# Patient Record
Sex: MALE | URBAN - NONMETROPOLITAN AREA
[De-identification: names, ages, dates, MRNs, and addresses within clinical notes are randomized per-mention and may not be internally consistent; named-entity substitution may affect disease eponyms.]

---
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
## 2023-11-13 ENCOUNTER — APPOINTMENT (EMERGENCY)
Dept: CT IMAGING | Facility: HOSPITAL | Age: 23
End: 2023-11-13
Payer: COMMERCIAL

## 2023-11-13 ENCOUNTER — HOSPITAL ENCOUNTER (EMERGENCY)
Facility: HOSPITAL | Age: 23
Discharge: HOME | End: 2023-11-13
Attending: EMERGENCY MEDICINE
Payer: COMMERCIAL

## 2023-11-13 VITALS
TEMPERATURE: 98 F | SYSTOLIC BLOOD PRESSURE: 96 MMHG | RESPIRATION RATE: 18 BRPM | DIASTOLIC BLOOD PRESSURE: 53 MMHG | HEART RATE: 51 BPM | OXYGEN SATURATION: 98 %

## 2023-11-13 DIAGNOSIS — R56.9 SEIZURE (HCC): Primary | ICD-10-CM

## 2023-11-13 DIAGNOSIS — F10.20 ALCOHOL DEPENDENCE (HCC): ICD-10-CM

## 2023-11-13 LAB
ALBUMIN SERPL BCP-MCNC: 4.7 G/DL (ref 3.5–5)
ALP SERPL-CCNC: 100 U/L (ref 34–104)
ALT SERPL W P-5'-P-CCNC: 42 U/L (ref 7–52)
ANION GAP SERPL CALCULATED.3IONS-SCNC: 13 MMOL/L
AST SERPL W P-5'-P-CCNC: 20 U/L (ref 13–39)
ATRIAL RATE: 80 BPM
BASOPHILS # BLD AUTO: 0.05 THOUSANDS/ÂΜL (ref 0–0.1)
BASOPHILS NFR BLD AUTO: 0 % (ref 0–1)
BILIRUB SERPL-MCNC: 0.7 MG/DL (ref 0.2–1)
BUN SERPL-MCNC: 16 MG/DL (ref 5–25)
CALCIUM SERPL-MCNC: 9.6 MG/DL (ref 8.4–10.2)
CHLORIDE SERPL-SCNC: 101 MMOL/L (ref 96–108)
CO2 SERPL-SCNC: 22 MMOL/L (ref 21–32)
CREAT SERPL-MCNC: 0.88 MG/DL (ref 0.6–1.3)
EOSINOPHIL # BLD AUTO: 0.01 THOUSAND/ÂΜL (ref 0–0.61)
EOSINOPHIL NFR BLD AUTO: 0 % (ref 0–6)
ERYTHROCYTE [DISTWIDTH] IN BLOOD BY AUTOMATED COUNT: 13.9 % (ref 11.6–15.1)
ETHANOL SERPL-MCNC: <10 MG/DL
GFR SERPL CREATININE-BSD FRML MDRD: 121 ML/MIN/1.73SQ M
GLUCOSE SERPL-MCNC: 102 MG/DL (ref 65–140)
GLUCOSE SERPL-MCNC: 103 MG/DL (ref 65–140)
HCT VFR BLD AUTO: 43.7 % (ref 36.5–49.3)
HGB BLD-MCNC: 15.4 G/DL (ref 12–17)
IMM GRANULOCYTES # BLD AUTO: 0.06 THOUSAND/UL (ref 0–0.2)
IMM GRANULOCYTES NFR BLD AUTO: 0 % (ref 0–2)
LYMPHOCYTES # BLD AUTO: 1.74 THOUSANDS/ÂΜL (ref 0.6–4.47)
LYMPHOCYTES NFR BLD AUTO: 12 % (ref 14–44)
MCH RBC QN AUTO: 31.1 PG (ref 26.8–34.3)
MCHC RBC AUTO-ENTMCNC: 35.2 G/DL (ref 31.4–37.4)
MCV RBC AUTO: 88 FL (ref 82–98)
MONOCYTES # BLD AUTO: 0.63 THOUSAND/ÂΜL (ref 0.17–1.22)
MONOCYTES NFR BLD AUTO: 4 % (ref 4–12)
NEUTROPHILS # BLD AUTO: 11.93 THOUSANDS/ÂΜL (ref 1.85–7.62)
NEUTS SEG NFR BLD AUTO: 84 % (ref 43–75)
NRBC BLD AUTO-RTO: 0 /100 WBCS
P AXIS: 36 DEGREES
PLATELET # BLD AUTO: 327 THOUSANDS/UL (ref 149–390)
PMV BLD AUTO: 10.4 FL (ref 8.9–12.7)
POTASSIUM SERPL-SCNC: 4 MMOL/L (ref 3.5–5.3)
PR INTERVAL: 180 MS
PROT SERPL-MCNC: 7.5 G/DL (ref 6.4–8.4)
QRS AXIS: 81 DEGREES
QRSD INTERVAL: 98 MS
QT INTERVAL: 398 MS
QTC INTERVAL: 459 MS
RBC # BLD AUTO: 4.95 MILLION/UL (ref 3.88–5.62)
SODIUM SERPL-SCNC: 136 MMOL/L (ref 135–147)
T WAVE AXIS: 32 DEGREES
VENTRICULAR RATE: 80 BPM
WBC # BLD AUTO: 14.42 THOUSAND/UL (ref 4.31–10.16)

## 2023-11-13 PROCEDURE — 85025 COMPLETE CBC W/AUTO DIFF WBC: CPT | Performed by: EMERGENCY MEDICINE

## 2023-11-13 PROCEDURE — 80053 COMPREHEN METABOLIC PANEL: CPT | Performed by: EMERGENCY MEDICINE

## 2023-11-13 PROCEDURE — 82948 REAGENT STRIP/BLOOD GLUCOSE: CPT

## 2023-11-13 PROCEDURE — 96361 HYDRATE IV INFUSION ADD-ON: CPT

## 2023-11-13 PROCEDURE — 99285 EMERGENCY DEPT VISIT HI MDM: CPT | Performed by: EMERGENCY MEDICINE

## 2023-11-13 PROCEDURE — 82077 ASSAY SPEC XCP UR&BREATH IA: CPT | Performed by: EMERGENCY MEDICINE

## 2023-11-13 PROCEDURE — 93005 ELECTROCARDIOGRAM TRACING: CPT

## 2023-11-13 PROCEDURE — 99284 EMERGENCY DEPT VISIT MOD MDM: CPT

## 2023-11-13 PROCEDURE — 96365 THER/PROPH/DIAG IV INF INIT: CPT

## 2023-11-13 PROCEDURE — 96375 TX/PRO/DX INJ NEW DRUG ADDON: CPT

## 2023-11-13 PROCEDURE — 70450 CT HEAD/BRAIN W/O DYE: CPT

## 2023-11-13 PROCEDURE — 36415 COLL VENOUS BLD VENIPUNCTURE: CPT | Performed by: EMERGENCY MEDICINE

## 2023-11-13 PROCEDURE — 93010 ELECTROCARDIOGRAM REPORT: CPT | Performed by: INTERNAL MEDICINE

## 2023-11-13 RX ORDER — ONDANSETRON 2 MG/ML
4 INJECTION INTRAMUSCULAR; INTRAVENOUS ONCE
Status: COMPLETED | OUTPATIENT
Start: 2023-11-13 | End: 2023-11-13

## 2023-11-13 RX ADMIN — SODIUM CHLORIDE 1000 ML: 0.9 INJECTION, SOLUTION INTRAVENOUS at 13:51

## 2023-11-13 RX ADMIN — ONDANSETRON 4 MG: 2 INJECTION INTRAMUSCULAR; INTRAVENOUS at 12:19

## 2023-11-13 RX ADMIN — FOLIC ACID: 5 INJECTION, SOLUTION INTRAMUSCULAR; INTRAVENOUS; SUBCUTANEOUS at 14:58

## 2023-11-13 NOTE — ED PROVIDER NOTES
Pt Name: Harriett Mulligan  MRN: [id number]  [address] [place] [address]  Age/Sex: 21 y.o. male  Date of evaluation: 11/13/2023  PCP: No primary care provider on file. CHIEF COMPLAINT    Chief Complaint   Patient presents with    Seizure - Prior Hx Of     Per patients girlfriend, pt had a seizure right before arrival. Was reported as full body seizure. Pt does take med for seizure but unsure what and if he took it this morning. Pt still post ictal on arrival to ED. HPI    Judi Roman presents to the Emergency Department complaining of a seizure. He admits that it is alcohol related and it has happened to him in the past.  He does not want help with his addiction. He is  not on medication for seizures. HPI      Past Medical and Surgical History    Past Medical History:   Diagnosis Date    Fracture of right wrist        History reviewed. No pertinent surgical history. Family History   Problem Relation Age of Onset    Alcohol abuse Mother     Diabetes Maternal Grandfather        Social History     Tobacco Use    Smoking status: Never    Smokeless tobacco: Never   Substance Use Topics    Alcohol use: No    Drug use: Yes     Types: Marijuana         . Allergies    Allergies   Allergen Reactions    Penicillins        Home Medications    Prior to Admission medications    Not on File           Review of Systems    Review of Systems   Constitutional:  Negative for chills and fever. HENT:  Negative for ear pain and sore throat. Eyes:  Negative for pain and visual disturbance. Respiratory:  Negative for cough and shortness of breath. Cardiovascular:  Negative for chest pain and palpitations. Gastrointestinal:  Negative for abdominal pain and vomiting. Genitourinary:  Negative for dysuria and hematuria. Musculoskeletal:  Negative for arthralgias and back pain. Skin:  Negative for color change and rash. Neurological:  Positive for headaches. Negative for seizures and syncope.    All other systems reviewed and are negative. Physical Exam      ED Triage Vitals   Temperature Pulse Respirations Blood Pressure SpO2   11/13/23 1717 11/13/23 1204 11/13/23 1204 11/13/23 1204 11/13/23 1204   98 °F (36.7 °C) 68 16 115/61 100 %      Temp Source Heart Rate Source Patient Position - Orthostatic VS BP Location FiO2 (%)   11/13/23 1204 11/13/23 1204 11/13/23 1204 11/13/23 1204 --   Oral Monitor Lying Right arm       Pain Score       11/13/23 1204       No Pain               Physical Exam  Vitals and nursing note reviewed. Constitutional:       General: He is not in acute distress. Appearance: He is well-developed. He is not diaphoretic. HENT:      Head: Normocephalic and atraumatic. Nose: Nose normal.   Eyes:      Conjunctiva/sclera: Conjunctivae normal.      Pupils: Pupils are equal, round, and reactive to light. Cardiovascular:      Rate and Rhythm: Normal rate and regular rhythm. Heart sounds: Normal heart sounds. No murmur heard. No friction rub. No gallop. Pulmonary:      Effort: Pulmonary effort is normal. No respiratory distress. Breath sounds: Normal breath sounds. No wheezing or rales. Abdominal:      General: Bowel sounds are normal.      Palpations: Abdomen is soft. Tenderness: There is no abdominal tenderness. There is no guarding or rebound. Musculoskeletal:         General: Normal range of motion. Cervical back: Normal range of motion and neck supple. Skin:     General: Skin is warm and dry. Neurological:      Mental Status: He is alert and oriented to person, place, and time. Psychiatric:         Behavior: Behavior normal.         Assessment and Plan    Shayla Edgar is a 21 y.o. male who presents with an isolated seizure that is presumed from alcohol withdrawl. Physical examination unremarkable. . Plan will be to perform diagnostic testing and treat symptomatically as needed.       MDM      Diagnostic Results        Labs:    Results for orders placed or performed during the hospital encounter of 11/13/23   CBC and differential   Result Value Ref Range    WBC 14.42 (H) 4.31 - 10.16 Thousand/uL    RBC 4.95 3.88 - 5.62 Million/uL    Hemoglobin 15.4 12.0 - 17.0 g/dL    Hematocrit 43.7 36.5 - 49.3 %    MCV 88 82 - 98 fL    MCH 31.1 26.8 - 34.3 pg    MCHC 35.2 31.4 - 37.4 g/dL    RDW 13.9 11.6 - 15.1 %    MPV 10.4 8.9 - 12.7 fL    Platelets 030 333 - 719 Thousands/uL    nRBC 0 /100 WBCs    Neutrophils Relative 84 (H) 43 - 75 %    Immat GRANS % 0 0 - 2 %    Lymphocytes Relative 12 (L) 14 - 44 %    Monocytes Relative 4 4 - 12 %    Eosinophils Relative 0 0 - 6 %    Basophils Relative 0 0 - 1 %    Neutrophils Absolute 11.93 (H) 1.85 - 7.62 Thousands/µL    Immature Grans Absolute 0.06 0.00 - 0.20 Thousand/uL    Lymphocytes Absolute 1.74 0.60 - 4.47 Thousands/µL    Monocytes Absolute 0.63 0.17 - 1.22 Thousand/µL    Eosinophils Absolute 0.01 0.00 - 0.61 Thousand/µL    Basophils Absolute 0.05 0.00 - 0.10 Thousands/µL   Comprehensive metabolic panel   Result Value Ref Range    Sodium 136 135 - 147 mmol/L    Potassium 4.0 3.5 - 5.3 mmol/L    Chloride 101 96 - 108 mmol/L    CO2 22 21 - 32 mmol/L    ANION GAP 13 mmol/L    BUN 16 5 - 25 mg/dL    Creatinine 0.88 0.60 - 1.30 mg/dL    Glucose 102 65 - 140 mg/dL    Calcium 9.6 8.4 - 10.2 mg/dL    AST 20 13 - 39 U/L    ALT 42 7 - 52 U/L    Alkaline Phosphatase 100 34 - 104 U/L    Total Protein 7.5 6.4 - 8.4 g/dL    Albumin 4.7 3.5 - 5.0 g/dL    Total Bilirubin 0.70 0.20 - 1.00 mg/dL    eGFR 121 ml/min/1.73sq m   Ethanol   Result Value Ref Range    Ethanol Lvl <10 <10 mg/dL   ECG 12 lead   Result Value Ref Range    Ventricular Rate 80 BPM    Atrial Rate 80 BPM    RI Interval 180 ms    QRSD Interval 98 ms    QT Interval 398 ms    QTC Interval 459 ms    P Axis 36 degrees    QRS Axis 81 degrees    T Wave Axis 32 degrees   Fingerstick Glucose (POCT)   Result Value Ref Range    POC Glucose 103 65 - 140 mg/dl       All labs reviewed and utilized in the medical decision making process    Radiology:    CT head wo contrast   Final Result      No acute intracranial abnormality. Workstation performed: [id number]             All radiology studies independently viewed by me and interpreted by the radiologist.    Procedure    Procedures      ED Course of Care and Re-Assessments    I offered resources multiple times and he declined. He tells me that he will just start "going to meetings". He plans to [facility] from here. His mother and girlfriend are aware that he is here. Medications   folic acid 1 mg, thiamine (VITAMIN B1) 100 mg in sodium chloride 0.9 % 100 mL IV piggyback ( Intravenous Stopped 11/13/23 1528)   ondansetron (ZOFRAN) injection 4 mg (4 mg Intravenous Given 11/13/23 1219)   sodium chloride 0.9 % bolus 1,000 mL (0 mL Intravenous Stopped 11/13/23 1804)           FINAL IMPRESSION    Final diagnoses:   Seizure ([address])   Alcohol dependence ([address])         DISPOSITION/PLAN    Time reflects when diagnosis was documented in both MDM as applicable and the Disposition within this note       Time User Action Codes Description Comment    11/13/2023  4:48 PM Smitha Curry Add [R56.9] Seizure ([address])     11/13/2023  4:49 PM Smitha Curry Add [F10.20] Alcohol dependence [facility])           ED Disposition       ED Disposition   Discharge    Condition   Stable    Date/Time   Mon Nov 13, 2023  4:48 PM    Comment   Godfrey Novak discharge to home/self care.                    Follow-up Information       Follow up With Specialties Details Why Contact Info Additional [facility] Emergency Department Emergency Medicine  As needed, If symptoms worsen [address] [address]  [facility] Emergency Department, [address] [place] [address]., 1 [facility], [address] [place] [address]  Schedule an appointment as soon as possible for a visit   [address] [facility]  [address] [facility] [address]  [address] [facility] Neurology [facility] Neurology Schedule an appointment as soon as possible for a visit   [address] [address]  [address] Neurology [facility], [facility] [place], [place], [address] [facility]              PATIENT REFERRED TO:    [address] [facility] Emergency Department  [address] [address] [phone]    As needed, If symptoms worsen    [facility]  [address] [facility]  [address] [facility]  [phone]  Schedule an appointment as soon as possible for a visit       [facility] Neurology [facility]  [address] [address] [phone]  Schedule an appointment as soon as possible for a visit         DISCHARGE MEDICATIONS:    There are no discharge medications for this patient. No discharge procedures on file.          Castillo Esparza, [address] DO  11/13/23 Loly Smith

## 2023-11-13 NOTE — ED NOTES
Pts mother called for update and would like to speak with son. Update given.  Pt has cellphone at bedside, pt told to call mother per her request.     Duncan Colon RN  11/13/23 3928 Abd US negative for Intussusception. Patient sleeping not in pain. Will provide mom with instructions to continue to take Azithromycin at home as prescribed can give tylenol for pain at home. Should follow with pmd in 1-3 days. Brittani Mckenzie, PGY3

## 2023-12-24 ENCOUNTER — HOSPITAL ENCOUNTER (EMERGENCY)
Facility: HOSPITAL | Age: 23
Discharge: HOME | End: 2023-12-24
Attending: EMERGENCY MEDICINE

## 2023-12-24 VITALS
OXYGEN SATURATION: 99 % | SYSTOLIC BLOOD PRESSURE: 160 MMHG | TEMPERATURE: 97.1 F | HEART RATE: 54 BPM | DIASTOLIC BLOOD PRESSURE: 104 MMHG | RESPIRATION RATE: 12 BRPM

## 2023-12-24 DIAGNOSIS — T40.1X1A HEROIN OVERDOSE (HCC): Primary | ICD-10-CM

## 2023-12-24 PROCEDURE — 99284 EMERGENCY DEPT VISIT MOD MDM: CPT | Performed by: EMERGENCY MEDICINE

## 2023-12-24 PROCEDURE — 99284 EMERGENCY DEPT VISIT MOD MDM: CPT

## 2023-12-24 RX ORDER — ONDANSETRON 4 MG/1
4 TABLET, ORALLY DISINTEGRATING ORAL ONCE
Status: COMPLETED | OUTPATIENT
Start: 2023-12-24 | End: 2023-12-24

## 2023-12-24 RX ORDER — IBUPROFEN 600 MG/1
600 TABLET ORAL ONCE
Status: COMPLETED | OUTPATIENT
Start: 2023-12-24 | End: 2023-12-24

## 2023-12-24 RX ORDER — ONDANSETRON 4 MG/1
1 TABLET, ORALLY DISINTEGRATING ORAL ONCE
Status: DISCONTINUED | OUTPATIENT
Start: 2023-12-24 | End: 2023-12-24

## 2023-12-24 RX ORDER — ONDANSETRON 2 MG/ML
1 INJECTION INTRAMUSCULAR; INTRAVENOUS ONCE
Status: COMPLETED | OUTPATIENT
Start: 2023-12-24 | End: 2023-12-24

## 2023-12-24 RX ORDER — NALOXONE HYDROCHLORIDE 1 MG/ML
2 INJECTION PARENTERAL ONCE
Status: COMPLETED | OUTPATIENT
Start: 2023-12-24 | End: 2023-12-24

## 2023-12-24 RX ADMIN — NALOXONE HYDROCHLORIDE 4 MG: 4 SPRAY NASAL at 18:15

## 2023-12-24 RX ADMIN — ONDANSETRON 4 MG: 4 TABLET, ORALLY DISINTEGRATING ORAL at 18:15

## 2023-12-24 RX ADMIN — IBUPROFEN 600 MG: 600 TABLET ORAL at 18:15

## 2023-12-24 NOTE — ED PROVIDER NOTES
History  Chief Complaint   Patient presents with    Heroin Overdose - Accidental     Used fentanyl, given 3mg narcan by EMS and 4mg zofran     Patient is a 23-year-old male.  He snorted opiates prior to arrival and fell out.  He denies fall or trauma.  He required Narcan by EMS.  He was in his apartment with his girlfriend.  Girlfriend called EMS.  He does report having a couple beers today.  Denies other drug use.  He reports that he does not usually use opiates.  He does not desire detox/drug and alcohol treatment.  This was accidental.  It was not a suicide attempt.        None       Past Medical History:   Diagnosis Date    Fracture of right wrist        History reviewed. No pertinent surgical history.    Family History   Problem Relation Age of Onset    Alcohol abuse Mother     Diabetes Maternal Grandfather      I have reviewed and agree with the history as documented.    E-Cigarette/Vaping     E-Cigarette/Vaping Substances     Social History     Tobacco Use    Smoking status: Never    Smokeless tobacco: Never   Substance Use Topics    Alcohol use: No    Drug use: Yes     Types: Marijuana       Review of Systems   Constitutional:  Negative for chills and fever.   HENT:  Negative for rhinorrhea and sore throat.    Eyes:  Negative for pain, redness and visual disturbance.   Respiratory:  Negative for cough and shortness of breath.    Cardiovascular:  Negative for chest pain and leg swelling.   Gastrointestinal:  Positive for nausea and vomiting. Negative for abdominal pain and diarrhea.   Endocrine: Negative for polydipsia and polyuria.   Genitourinary:  Negative for dysuria, frequency and hematuria.   Musculoskeletal:  Negative for back pain and neck pain.   Skin:  Negative for rash and wound.   Allergic/Immunologic: Negative for immunocompromised state.   Neurological:  Positive for headaches. Negative for weakness and numbness.   Psychiatric/Behavioral:  Negative for hallucinations and suicidal ideas.    All  other systems reviewed and are negative.      Physical Exam  Physical Exam  Vitals reviewed.   Constitutional:       General: He is not in acute distress.     Appearance: He is not toxic-appearing.   HENT:      Head: Normocephalic and atraumatic.      Nose: Nose normal.      Mouth/Throat:      Mouth: Mucous membranes are moist.   Eyes:      General:         Right eye: No discharge.         Left eye: No discharge.      Conjunctiva/sclera: Conjunctivae normal.   Cardiovascular:      Rate and Rhythm: Normal rate and regular rhythm.      Pulses: Normal pulses.      Heart sounds: Normal heart sounds. No murmur heard.     No friction rub. No gallop.   Pulmonary:      Effort: Pulmonary effort is normal. No respiratory distress.      Breath sounds: Normal breath sounds. No stridor. No wheezing, rhonchi or rales.   Abdominal:      General: Bowel sounds are normal. There is no distension.      Palpations: Abdomen is soft.      Tenderness: There is no abdominal tenderness. There is no right CVA tenderness, left CVA tenderness, guarding or rebound.   Musculoskeletal:         General: No swelling, tenderness, deformity or signs of injury. Normal range of motion.      Cervical back: Normal range of motion and neck supple. No rigidity.      Right lower leg: No edema.      Left lower leg: No edema.      Comments: No calf pain or unilateral leg swelling   Skin:     General: Skin is warm and dry.      Coloration: Skin is not jaundiced or pale.      Findings: No bruising, erythema or rash.   Neurological:      General: No focal deficit present.      Mental Status: He is alert and oriented to person, place, and time.      Cranial Nerves: No facial asymmetry.      Sensory: No sensory deficit.      Motor: Motor function is intact.   Psychiatric:         Mood and Affect: Mood normal.         Behavior: Behavior normal.         Vital Signs  ED Triage Vitals [12/24/23 1800]   Temperature Pulse Respirations Blood Pressure SpO2   (!) 97.1 °F  (36.2 °C) (!) 54 12 (!) 160/104 98 %      Temp Source Heart Rate Source Patient Position - Orthostatic VS BP Location FiO2 (%)   Tympanic -- -- -- --      Pain Score       No Pain           Vitals:    12/24/23 1800   BP: (!) 160/104   Pulse: (!) 54         Visual Acuity      ED Medications  Medications   naloxone (FOR EMS ONLY) (NARCAN) 2 MG/2ML injection 4 mg (0 mg Does not apply Given to EMS 12/24/23 1801)   ondansetron (FOR EMS ONLY) (ZOFRAN) 4 mg/2 mL injection 4 mg (0 mg Does not apply Given to EMS 12/24/23 1801)   ondansetron (ZOFRAN-ODT) dispersible tablet 4 mg (4 mg Oral Given 12/24/23 1815)   ibuprofen (MOTRIN) tablet 600 mg (600 mg Oral Given 12/24/23 1815)   naloxone nasal- Given to patient by provider at discharge. (NARCAN) 4 mg/0.1 mL nasal spray 4 mg (4 mg Does not apply Given by Other 12/24/23 1815)       Diagnostic Studies  Results Reviewed       None                   No orders to display              Procedures  Procedures         ED Course                               SBIRT 22yo+      Flowsheet Row Most Recent Value   Initial Alcohol Screen: US AUDIT-C     1. How often do you have a drink containing alcohol? 6 Filed at: 12/24/2023 1800   2. How many drinks containing alcohol do you have on a typical day you are drinking?  3 Filed at: 12/24/2023 1800   3a. Male UNDER 65: How often do you have five or more drinks on one occasion? 4 Filed at: 12/24/2023 1800   Audit-C Score 13 Filed at: 12/24/2023 1800   TERE: How many times in the past year have you...    Used an illegal drug or used a prescription medication for non-medical reasons? Once or Twice Filed at: 12/24/2023 1800                      Medical Decision Making  Consistent with opiate overdose.  Other causes of unresponsiveness were considered such as head injury, acute stroke, subarachnoid hemorrhage, seizure, hypoglycemia, and other etiologies.  These are all unlikely.  Patient will be observed for an hour.  If no relapse, propria for  discharge and outpatient management.    Risk  Prescription drug management.  Decision regarding hospitalization.             Disposition  Final diagnoses:   Heroin overdose (HCC)     Time reflects when diagnosis was documented in both MDM as applicable and the Disposition within this note       Time User Action Codes Description Comment    12/24/2023  6:52 PM Oral Abernathy Add [T40.1X1A] Heroin overdose (HCC)           ED Disposition       ED Disposition   Discharge    Condition   Stable    Date/Time   Sun Dec 24, 2023 1852    Comment   Chris STONE Hartranft discharge to home/self care.                   Follow-up Information       Follow up With Specialties Details Why Contact [facility] 2nd Floor Family Medicine In 1 week  [address] [facility] 2ND FLOOR  [facility] [address]  [phone]              Patient's Medications    No medications on file       No discharge procedures on file.    PDMP Review       None            ED Provider  Electronically Signed by             Oral Abernathy MD  12/24/23 2374

## 2023-12-25 NOTE — ED CARE HANDOFF
[facility] Warm Handoff Outcome Note    Patient name Chris Novak  Location Z1H1/Z1H1 MRN [id number]  Age: 23 y.o.          Plan Type:  Warm Handoff                                                                                    Plan Date: 12/24/2023  Service:  ED Warm Handoff      Substance Use History:  opiates    Warm Handoff Update:  Pt does not want tx    Warm Handoff Outcome: Patient Refused

## 2024-01-09 ENCOUNTER — HOSPITAL ENCOUNTER (INPATIENT)
Facility: HOSPITAL | Age: 24
LOS: 1 days | Discharge: LEFT BEFORE DISCHARGE | DRG: 053 | End: 2024-01-09
Attending: EMERGENCY MEDICINE | Admitting: INTERNAL MEDICINE
Payer: COMMERCIAL

## 2024-01-09 ENCOUNTER — APPOINTMENT (EMERGENCY)
Dept: CT IMAGING | Facility: HOSPITAL | Age: 24
DRG: 053 | End: 2024-01-09
Payer: COMMERCIAL

## 2024-01-09 ENCOUNTER — HOSPITAL ENCOUNTER (EMERGENCY)
Facility: HOSPITAL | Age: 24
Discharge: HOME | End: 2024-01-09
Attending: EMERGENCY MEDICINE | Admitting: EMERGENCY MEDICINE

## 2024-01-09 ENCOUNTER — APPOINTMENT (INPATIENT)
Dept: MRI IMAGING | Facility: HOSPITAL | Age: 24
DRG: 053 | End: 2024-01-09
Payer: COMMERCIAL

## 2024-01-09 VITALS
HEART RATE: 98 BPM | RESPIRATION RATE: 18 BRPM | WEIGHT: 170 LBS | DIASTOLIC BLOOD PRESSURE: 78 MMHG | HEIGHT: 70 IN | BODY MASS INDEX: 24.34 KG/M2 | OXYGEN SATURATION: 97 % | SYSTOLIC BLOOD PRESSURE: 126 MMHG

## 2024-01-09 VITALS
SYSTOLIC BLOOD PRESSURE: 108 MMHG | HEIGHT: 70 IN | RESPIRATION RATE: 18 BRPM | HEART RATE: 58 BPM | DIASTOLIC BLOOD PRESSURE: 54 MMHG | BODY MASS INDEX: 25.25 KG/M2 | WEIGHT: 176.37 LBS | OXYGEN SATURATION: 95 % | TEMPERATURE: 98.4 F

## 2024-01-09 DIAGNOSIS — R56.9 SEIZURE (HCC): Primary | ICD-10-CM

## 2024-01-09 PROBLEM — G40.909 SEIZURE DISORDER (HCC): Status: ACTIVE | Noted: 2024-01-09

## 2024-01-09 PROBLEM — E87.29 HIGH ANION GAP METABOLIC ACIDOSIS: Status: ACTIVE | Noted: 2024-01-09

## 2024-01-09 LAB
ALBUMIN SERPL BCP-MCNC: 4.7 G/DL (ref 3.5–5)
ALP SERPL-CCNC: 113 U/L (ref 34–104)
ALT SERPL W P-5'-P-CCNC: 13 U/L (ref 7–52)
AMMONIA PLAS-SCNC: 68 UMOL/L (ref 18–72)
ANION GAP SERPL CALCULATED.3IONS-SCNC: 18 MMOL/L
APAP SERPL-MCNC: <2 UG/ML (ref 10–20)
AST SERPL W P-5'-P-CCNC: 27 U/L (ref 13–39)
ATRIAL RATE: 84 BPM
BASOPHILS # BLD AUTO: 0.04 THOUSANDS/ÂΜL (ref 0–0.1)
BASOPHILS NFR BLD AUTO: 0 % (ref 0–1)
BILIRUB SERPL-MCNC: 0.8 MG/DL (ref 0.2–1)
BUN SERPL-MCNC: 14 MG/DL (ref 5–25)
CALCIUM SERPL-MCNC: 9.4 MG/DL (ref 8.4–10.2)
CHLORIDE SERPL-SCNC: 96 MMOL/L (ref 96–108)
CK SERPL-CCNC: 162 U/L (ref 39–308)
CO2 SERPL-SCNC: 16 MMOL/L (ref 21–32)
CREAT SERPL-MCNC: 0.97 MG/DL (ref 0.6–1.3)
EOSINOPHIL # BLD AUTO: 0.01 THOUSAND/ÂΜL (ref 0–0.61)
EOSINOPHIL NFR BLD AUTO: 0 % (ref 0–6)
ERYTHROCYTE [DISTWIDTH] IN BLOOD BY AUTOMATED COUNT: 12.7 % (ref 11.6–15.1)
ETHANOL SERPL-MCNC: <10 MG/DL
GFR SERPL CREATININE-BSD FRML MDRD: 109 ML/MIN/1.73SQ M
GLUCOSE SERPL-MCNC: 114 MG/DL (ref 65–140)
HCT VFR BLD AUTO: 46 % (ref 36.5–49.3)
HGB BLD-MCNC: 15.4 G/DL (ref 12–17)
IMM GRANULOCYTES # BLD AUTO: 0.04 THOUSAND/UL (ref 0–0.2)
IMM GRANULOCYTES NFR BLD AUTO: 0 % (ref 0–2)
LACTATE SERPL-SCNC: 0.7 MMOL/L (ref 0.5–2)
LIPASE SERPL-CCNC: 15 U/L (ref 11–82)
LYMPHOCYTES # BLD AUTO: 1.78 THOUSANDS/ÂΜL (ref 0.6–4.47)
LYMPHOCYTES NFR BLD AUTO: 14 % (ref 14–44)
MCH RBC QN AUTO: 30 PG (ref 26.8–34.3)
MCHC RBC AUTO-ENTMCNC: 33.5 G/DL (ref 31.4–37.4)
MCV RBC AUTO: 90 FL (ref 82–98)
MONOCYTES # BLD AUTO: 0.59 THOUSAND/ÂΜL (ref 0.17–1.22)
MONOCYTES NFR BLD AUTO: 5 % (ref 4–12)
NEUTROPHILS # BLD AUTO: 10.68 THOUSANDS/ÂΜL (ref 1.85–7.62)
NEUTS SEG NFR BLD AUTO: 81 % (ref 43–75)
NRBC BLD AUTO-RTO: 0 /100 WBCS
P AXIS: 50 DEGREES
PLATELET # BLD AUTO: 337 THOUSANDS/UL (ref 149–390)
PMV BLD AUTO: 10 FL (ref 8.9–12.7)
POTASSIUM SERPL-SCNC: 4.8 MMOL/L (ref 3.5–5.3)
PR INTERVAL: 174 MS
PROT SERPL-MCNC: 7.5 G/DL (ref 6.4–8.4)
QRS AXIS: 80 DEGREES
QRSD INTERVAL: 102 MS
QT INTERVAL: 398 MS
QTC INTERVAL: 470 MS
RBC # BLD AUTO: 5.13 MILLION/UL (ref 3.88–5.62)
SALICYLATES SERPL-MCNC: <5 MG/DL (ref 3–20)
SODIUM SERPL-SCNC: 130 MMOL/L (ref 135–147)
T WAVE AXIS: 49 DEGREES
VENTRICULAR RATE: 84 BPM
WBC # BLD AUTO: 13.14 THOUSAND/UL (ref 4.31–10.16)

## 2024-01-09 PROCEDURE — 99284 EMERGENCY DEPT VISIT MOD MDM: CPT

## 2024-01-09 PROCEDURE — 82077 ASSAY SPEC XCP UR&BREATH IA: CPT

## 2024-01-09 PROCEDURE — 82140 ASSAY OF AMMONIA: CPT

## 2024-01-09 PROCEDURE — 83690 ASSAY OF LIPASE: CPT

## 2024-01-09 PROCEDURE — 85025 COMPLETE CBC W/AUTO DIFF WBC: CPT

## 2024-01-09 PROCEDURE — 80179 DRUG ASSAY SALICYLATE: CPT

## 2024-01-09 PROCEDURE — 99291 CRITICAL CARE FIRST HOUR: CPT | Performed by: EMERGENCY MEDICINE

## 2024-01-09 PROCEDURE — 99223 1ST HOSP IP/OBS HIGH 75: CPT | Performed by: INTERNAL MEDICINE

## 2024-01-09 PROCEDURE — 93005 ELECTROCARDIOGRAM TRACING: CPT

## 2024-01-09 PROCEDURE — 99285 EMERGENCY DEPT VISIT HI MDM: CPT

## 2024-01-09 PROCEDURE — 80053 COMPREHEN METABOLIC PANEL: CPT

## 2024-01-09 PROCEDURE — G1004 CDSM NDSC: HCPCS

## 2024-01-09 PROCEDURE — 96365 THER/PROPH/DIAG IV INF INIT: CPT

## 2024-01-09 PROCEDURE — 70450 CT HEAD/BRAIN W/O DYE: CPT

## 2024-01-09 PROCEDURE — 36415 COLL VENOUS BLD VENIPUNCTURE: CPT

## 2024-01-09 PROCEDURE — 96372 THER/PROPH/DIAG INJ SC/IM: CPT

## 2024-01-09 PROCEDURE — 82550 ASSAY OF CK (CPK): CPT | Performed by: INTERNAL MEDICINE

## 2024-01-09 PROCEDURE — 83605 ASSAY OF LACTIC ACID: CPT | Performed by: INTERNAL MEDICINE

## 2024-01-09 PROCEDURE — 99283 EMERGENCY DEPT VISIT LOW MDM: CPT | Performed by: EMERGENCY MEDICINE

## 2024-01-09 PROCEDURE — 80143 DRUG ASSAY ACETAMINOPHEN: CPT

## 2024-01-09 PROCEDURE — 99222 1ST HOSP IP/OBS MODERATE 55: CPT | Performed by: STUDENT IN AN ORGANIZED HEALTH CARE EDUCATION/TRAINING PROGRAM

## 2024-01-09 RX ORDER — LORAZEPAM 2 MG/ML
2 INJECTION INTRAMUSCULAR ONCE
Status: COMPLETED | OUTPATIENT
Start: 2024-01-09 | End: 2024-01-09

## 2024-01-09 RX ORDER — LANOLIN ALCOHOL/MO/W.PET/CERES
100 CREAM (GRAM) TOPICAL DAILY
Status: DISCONTINUED | OUTPATIENT
Start: 2024-01-09 | End: 2024-01-09 | Stop reason: HOSPADM

## 2024-01-09 RX ORDER — ONDANSETRON 4 MG/1
4 TABLET, ORALLY DISINTEGRATING ORAL ONCE
Status: COMPLETED | OUTPATIENT
Start: 2024-01-09 | End: 2024-01-09

## 2024-01-09 RX ORDER — FOLIC ACID 1 MG/1
1 TABLET ORAL DAILY
Status: DISCONTINUED | OUTPATIENT
Start: 2024-01-09 | End: 2024-01-09 | Stop reason: HOSPADM

## 2024-01-09 RX ORDER — LEVETIRACETAM 500 MG/1
500 TABLET ORAL EVERY 12 HOURS SCHEDULED
Status: DISCONTINUED | OUTPATIENT
Start: 2024-01-09 | End: 2024-01-09 | Stop reason: HOSPADM

## 2024-01-09 RX ORDER — DROPERIDOL 2.5 MG/ML
2.5 INJECTION, SOLUTION INTRAMUSCULAR; INTRAVENOUS ONCE
Status: COMPLETED | OUTPATIENT
Start: 2024-01-09 | End: 2024-01-09

## 2024-01-09 RX ORDER — ONDANSETRON 2 MG/ML
4 INJECTION INTRAMUSCULAR; INTRAVENOUS EVERY 6 HOURS PRN
Status: DISCONTINUED | OUTPATIENT
Start: 2024-01-09 | End: 2024-01-09 | Stop reason: HOSPADM

## 2024-01-09 RX ORDER — LORAZEPAM 2 MG/ML
INJECTION INTRAMUSCULAR
Status: COMPLETED
Start: 2024-01-09 | End: 2024-01-09

## 2024-01-09 RX ORDER — LORAZEPAM 2 MG/ML
2 INJECTION INTRAMUSCULAR EVERY 8 HOURS PRN
Status: DISCONTINUED | OUTPATIENT
Start: 2024-01-09 | End: 2024-01-09 | Stop reason: HOSPADM

## 2024-01-09 RX ORDER — LEVETIRACETAM 500 MG/1
500 TABLET ORAL EVERY 12 HOURS SCHEDULED
Qty: 60 TABLET | Refills: 0 | Status: SHIPPED | OUTPATIENT
Start: 2024-01-09

## 2024-01-09 RX ORDER — ACETAMINOPHEN 650 MG/1
325 SUPPOSITORY RECTAL EVERY 4 HOURS PRN
Status: DISCONTINUED | OUTPATIENT
Start: 2024-01-09 | End: 2024-01-09 | Stop reason: HOSPADM

## 2024-01-09 RX ADMIN — LORAZEPAM 2 MG: 2 INJECTION INTRAMUSCULAR at 08:59

## 2024-01-09 RX ADMIN — ONDANSETRON 4 MG: 4 TABLET, ORALLY DISINTEGRATING ORAL at 06:57

## 2024-01-09 RX ADMIN — DROPERIDOL 2.5 MG: 2.5 INJECTION, SOLUTION INTRAMUSCULAR; INTRAVENOUS at 07:42

## 2024-01-09 RX ADMIN — LORAZEPAM 2 MG: 2 INJECTION INTRAMUSCULAR; INTRAVENOUS at 08:59

## 2024-01-09 RX ADMIN — ACETAMINOPHEN 325 MG: 325 SUPPOSITORY RECTAL at 13:00

## 2024-01-09 NOTE — ED NOTES
Patient asked for more water, RN educated patient that he cannot have anymore water at this time because he continues to vomit      Sancho Llanos RN  01/09/24 0714

## 2024-01-09 NOTE — ASSESSMENT & PLAN NOTE
Patient presents with new onset seizures witnessed by girlfriend and while in the ED  Initially declined evaluation and treatment; brought back to ED after experiencing another seizure  Neurology consulted, recommended admission for workup and treatment  As per ED staff, neurology recommended admission to [place] versus transfer to [place] for EMU  Continue [facility]  Consult to neurology  Seizure precautions  Patient with leukocytosis likely secondary to seizure activity (no other SIRS criteria); low suspicion for active infection  Urine drug screen pending; however acetaminophen, aspirin, and alcohol negative; patient reports illicit substance usage  EKG within normal limits; CT head negative

## 2024-01-09 NOTE — ED NOTES
Patient continues to thrash in b/l soft limb restraints, able to pull them loose due to his strength, resident called to bedside for further eval. RN unable to safely obtain IV access due to patient swinging. Resident aware, per verbal from Resident, hold on IV and IV medications and let patient lay still.      Sancho Llanos RN  01/09/24 0921

## 2024-01-09 NOTE — ED NOTES
Soft wrist restraints removed, patient able to follow commands.      Sancho Llanos RN  01/09/24 1002

## 2024-01-09 NOTE — ED ATTENDING ATTESTATION
1/9/2024  I, Kelsi Lind DO, saw and evaluated the patient. I have discussed the patient with the resident/non-physician practitioner and agree with the resident's/non-physician practitioner's findings, Plan of Care, and MDM as documented in the resident's/non-physician practitioner's note, except where noted. All available labs and Radiology studies were reviewed.  I was present for key portions of any procedure(s) performed by the resident/non-physician practitioner and I was immediately available to provide assistance.       At this point I agree with the current assessment done in the Emergency Department.  I have conducted an independent evaluation of this patient a history and physical is as follows:    ED Course  ED Course as of 01/09/24 1251   Tue Jan 09, 2024   0902 Pt had generalized seizure for approx 3 min while in ER, sats dropped to 80s per RN.  Pt given 2mg IM Ativan.  Pt post-ictal upon my arrival to room.     23 y.o. M w/h/o seizures p/w possible seizure.  Pt was here about 4 hours ago for a possible seizure.  Pt requested to leave and left before any work up at that time.  He states girlfriend said he had a seizure PTA and called for EMS.  Pt sticks hand down throat to make himself vomit.  Pt relates his seizures to alcohol use.  States he is not a daily drinker and hasn't had any drinks since last weekend.  Reports he started with a generalized headache upon ED arrival.  Denies recent illness, blurry vision, focal deficits, trauma.  Heart RRR. Lungs CTAB. Abd soft NT/ND.  Moves extremities equally on exam. Plan: Labs, CT head to r/o mass lesion/bleed.    Critical Care Time  CriticalCare Time    Date/Time: 1/9/2024 10:03 AM    Performed by: Kelsi Lind DO  Authorized by: Kelsi Lind DO    Critical care provider statement:     Critical care time (minutes):  30    Critical care time was exclusive of:  Separately billable procedures and treating other patients and teaching time     Critical care was necessary to treat or prevent imminent or life-threatening deterioration of the following conditions:  CNS failure or compromise    Critical care was time spent personally by me on the following activities:  Blood draw for specimens, obtaining history from patient or surrogate, development of treatment plan with patient or surrogate, discussions with consultants, evaluation of patient's response to treatment, examination of patient, ordering and performing treatments and interventions, ordering and review of laboratory studies, ordering and review of radiographic studies, re-evaluation of patient's condition and review of old charts    I assumed direction of critical care for this patient from another provider in my specialty: no    Comments:      Pt with seizure while in ED requiring Ativan

## 2024-01-09 NOTE — ED PROVIDER NOTES
History  Chief Complaint   Patient presents with    Seizure - Prior Hx Of     Pt arrives via EMS from home after experiencing a seizure. Pt was seen here earlier tonight for another seizure earlier but refused treatment and left. Pt alert and oriented x 4 upon arrival.      HPI  Chris Novak is a 23 y.o. male who presents to the emergency department after a possible seizure.  Patient reportedly was brought to the ER earlier in the night after his girlfriend witnessed a seizure.  Per chart review patient refused all workup and left the ER.  Patient returns to the ER after his girlfriend reportedly witnessed another seizure.  Patient confused, difficult to obtain history.  Unclear if patient has prior history of seizures, he had a single ER visit in the past after a possible seizure, but states he has never been evaluated by a primary care doctor or neurologist for seizures.  Patient denies head injuries or trauma.  He is currently complaining of headache and vomiting.  He denies weakness, numbness, blurry vision, lightheadedness, or vertigo.  He states he uses marijuana but denies other drug use, although has a recent ER visit for opioid overdose.  He states he drinks alcohol approximately 3 times a week with approximately 2 drinks at a time, he states his last drink was 1 week ago.  He denies taking any medications at home.    None       Past Medical History:   Diagnosis Date    Fracture of right wrist        No past surgical history on file.    Family History   Problem Relation Age of Onset    Alcohol abuse Mother     Diabetes Maternal Grandfather      I have reviewed and agree with the history as documented.    E-Cigarette/Vaping     E-Cigarette/Vaping Substances     Social History     Tobacco Use    Smoking status: Never    Smokeless tobacco: Never   Substance Use Topics    Alcohol use: No    Drug use: Yes     Types: Marijuana       Home medications:  None     Allergies:  Allergies   Allergen Reactions     Penicillins         Review of Systems   Constitutional:  Negative for fever.   Respiratory:  Negative for shortness of breath.    Cardiovascular:  Negative for chest pain.   Gastrointestinal:  Positive for nausea and vomiting. Negative for abdominal pain.   Genitourinary:  Negative for dysuria.   Neurological:  Positive for headaches. Negative for dizziness, weakness, light-headedness and numbness.   All other systems reviewed and are negative.      Physical Exam  ED Triage Vitals   Temperature Pulse Respirations Blood Pressure SpO2   01/09/24 0645 01/09/24 0632 01/09/24 0632 01/09/24 0632 01/09/24 0632   97.6 °F (36.4 °C) 98 16 109/67 97 %      Temp Source Heart Rate Source Patient Position - Orthostatic VS BP Location FiO2 (%)   01/09/24 0645 01/09/24 0632 01/09/24 0632 01/09/24 0632 --   Axillary Monitor Lying Right arm       Pain Score       01/09/24 0632       6             Orthostatic Vital Signs  Vitals:    01/09/24 0645 01/09/24 0900 01/09/24 1130 01/09/24 1300   BP: 116/75 132/68 115/56 135/72   Pulse: 66 78 75 64   Patient Position - Orthostatic VS: Lying Lying Lying Lying       Physical Exam  Vitals and nursing note reviewed.   Constitutional:       General: He is not in acute distress.     Appearance: He is not toxic-appearing or diaphoretic.   HENT:      Head: Normocephalic.      Mouth/Throat:      Mouth: Mucous membranes are moist.   Eyes:      Extraocular Movements: Extraocular movements intact.      Pupils: Pupils are equal, round, and reactive to light.   Cardiovascular:      Rate and Rhythm: Normal rate and regular rhythm.      Heart sounds: No murmur heard.  Pulmonary:      Effort: Pulmonary effort is normal. No respiratory distress.      Breath sounds: Normal breath sounds. No wheezing, rhonchi or rales.   Abdominal:      General: Abdomen is flat. There is no distension.      Palpations: Abdomen is soft.      Tenderness: There is no abdominal tenderness. There is no guarding or rebound.    Musculoskeletal:      Cervical back: Normal range of motion. No rigidity.   Skin:     General: Skin is warm and dry.   Neurological:      Mental Status: He is alert.      Cranial Nerves: No cranial nerve deficit.      Sensory: No sensory deficit.      Motor: No weakness.         ED Medications  Medications   sodium chloride 0.9 % bolus 1,000 mL (0 mL Intravenous Stopped 1/9/24 1100)   levETIRAcetam (KEPPRA) 1,000 mg in sodium chloride 0.9 % 100 mL IVPB (0 mg Intravenous Stopped 1/9/24 1056)   ondansetron (ZOFRAN) injection 4 mg (has no administration in time range)   LORazepam (ATIVAN) injection 2 mg (has no administration in time range)   thiamine tablet 100 mg (0 mg Oral Hold 1/9/24 1224)   folic acid (FOLVITE) tablet 1 mg (0 mg Oral Hold 1/9/24 1223)   multivitamin-minerals (CENTRUM) tablet 1 tablet (0 tablets Oral Hold 1/9/24 1224)   acetaminophen (TYLENOL) rectal suppository 325 mg (325 mg Rectal Given 1/9/24 1300)   ondansetron (ZOFRAN-ODT) dispersible tablet 4 mg (4 mg Oral Given 1/9/24 0657)   droperidol (INAPSINE) injection 2.5 mg (2.5 mg Intramuscular Given 1/9/24 0742)   LORazepam (ATIVAN) injection 2 mg (2 mg Intramuscular Given 1/9/24 0859)       Diagnostic Studies  Results Reviewed       Procedure Component Value Units Date/Time    Lactic acid, plasma (w/reflex if result > 2.0) [[id number]]  (Normal) Collected: 01/09/24 1222    Lab Status: Final result Specimen: Blood from Hand, Left Updated: 01/09/24 1327     LACTIC ACID 0.7 mmol/L     Narrative:      Result may be elevated if tourniquet was used during collection.    CK [[id number]]  (Normal) Collected: 01/09/24 0741    Lab Status: Final result Specimen: Blood from Arm, Left Updated: 01/09/24 1237     Total  U/L     UA (URINE) with reflex to Scope [[id number]]     Lab Status: No result Specimen: Urine     Rapid drug screen, urine [[id number]]     Lab Status: No result Specimen: Urine     Comprehensive metabolic panel [[id number]]  (Abnormal)  Collected: 01/09/24 0741    Lab Status: Final result Specimen: Blood from Arm, Left Updated: 01/09/24 0808     Sodium 130 mmol/L      Potassium 4.8 mmol/L      Chloride 96 mmol/L      CO2 16 mmol/L      ANION GAP 18 mmol/L      BUN 14 mg/dL      Creatinine 0.97 mg/dL      Glucose 114 mg/dL      Calcium 9.4 mg/dL      AST 27 U/L      ALT 13 U/L      Alkaline Phosphatase 113 U/L      Total Protein 7.5 g/dL      Albumin 4.7 g/dL      Total Bilirubin 0.80 mg/dL      eGFR 109 ml/min/1.73sq m     Narrative:      National Kid[employer] Disease Foundation guidelines for Chronic Kidney Disease (CKD):     Stage 1 with normal or high GFR (GFR > 90 mL/min/1.73 square meters)    Stage 2 Mild CKD (GFR = 60-89 mL/min/1.73 square meters)    Stage 3A Moderate CKD (GFR = 45-59 mL/min/1.73 square meters)    Stage 3B Moderate CKD (GFR = 30-44 mL/min/1.73 square meters)    Stage 4 Severe CKD (GFR = 15-29 mL/min/1.73 square meters)    Stage 5 End Stage CKD (GFR <15 mL/min/1.73 square meters)  Note: GFR calculation is accurate only with a steady state creatinine    Lipase [[id number]]  (Normal) Collected: 01/09/24 0741    Lab Status: Final result Specimen: Blood from Arm, Left Updated: 01/09/24 0808     Lipase 15 u/L     Salicylate level [[id number]]  (Normal) Collected: 01/09/24 0741    Lab Status: Final result Specimen: Blood from Arm, Left Updated: 01/09/24 0808     Salicylate Lvl <5 mg/dL     Acetaminophen level-If concentration is detectable, please discuss with medical  on call. [[id number]]  (Abnormal) Collected: 01/09/24 0741    Lab Status: Final result Specimen: Blood from Arm, Left Updated: 01/09/24 0808     Acetaminophen Level <2 ug/mL     Ammonia [[id number]]  (Normal) Collected: 01/09/24 0741    Lab Status: Final result Specimen: Blood from Arm, Left Updated: 01/09/24 0807     Ammonia 68 umol/L     Ethanol [[id number]]  (Normal) Collected: 01/09/24 0741    Lab Status: Final result Specimen: Blood from Arm, Left Updated:  01/09/24 0806     Ethanol Lvl <10 mg/dL     CBC and differential [[id number]]  (Abnormal) Collected: 01/09/24 0741    Lab Status: Final result Specimen: Blood from Arm, Left Updated: 01/09/24 0750     WBC 13.14 Thousand/uL      RBC 5.13 Million/uL      Hemoglobin 15.4 g/dL      Hematocrit 46.0 %      MCV 90 fL      MCH 30.0 pg      MCHC 33.5 g/dL      RDW 12.7 %      MPV 10.0 fL      Platelets 337 Thousands/uL      nRBC 0 /100 WBCs      Neutrophils Relative 81 %      Immat GRANS % 0 %      Lymphocytes Relative 14 %      Monocytes Relative 5 %      Eosinophils Relative 0 %      Basophils Relative 0 %      Neutrophils Absolute 10.68 Thousands/µL      Immature Grans Absolute 0.04 Thousand/uL      Lymphocytes Absolute 1.78 Thousands/µL      Monocytes Absolute 0.59 Thousand/µL      Eosinophils Absolute 0.01 Thousand/µL      Basophils Absolute 0.04 Thousands/µL                    CT head without contrast   ED Interpretation by Kelsi Lind DO (01/09 0842)   Interpreted by me as no obvious bleed      Final Result by Chris Vernon MD (01/09 0836)      No evidence of acute intracranial process or significant interval change.                  Workstation performed: [id number]         MRI inpatient order    (Results Pending)         Procedures  Procedures      ED Course                             SBIRT 22yo+      Flowsheet Row Most Recent Value   Initial Alcohol Screen: US AUDIT-C     1. How often do you have a drink containing alcohol? 4 Filed at: 01/09/2024 0632   2. How many drinks containing alcohol do you have on a typical day you are drinking?  1 Filed at: 01/09/2024 0632   3a. Male UNDER 65: How often do you have five or more drinks on one occasion? 0 Filed at: 01/09/2024 0632   Audit-C Score 5 Filed at: 01/09/2024 0632   TERE: How many times in the past year have you...    Used an illegal drug or used a prescription medication for non-medical reasons? Never Filed at: 01/09/2024 0632       "            [facility]  Medical Decision Making  Amount and/or Complexity of Data Reviewed  Labs: ordered.  Radiology: ordered.    Risk  Prescription drug management.  Decision regarding hospitalization.      Chris Novak is a 23 y.o. male who presents to the emergency department with seizure.  Patient reportedly had multiple seizure episodes at home, unclear history of prior seizures.  Patient initially confused and postictal appearing without focal neurologic deficit. workup including vital signs, physical exam, labs and CT. CT without acute abnormalities .  While in ER patient observed having generalized seizure, given IM Ativan with resolution of seizure.  Patient postictal, given IV Keppra load.  Neurology consulted.  Given repeated seizures and no clear prior workup, plan for admission.       Disposition  Final diagnoses:   Seizure (HCC)     Time reflects when diagnosis was documented in both MDM as applicable and the Disposition within this note       Time User Action Codes Description Comment    1/9/2024 10:40 AM Da Ward [R56.9] Seizure (HCC)           ED Disposition       ED Disposition   Admit    Condition   Stable    Date/Time   Tue Jan 9, 2024 1128    Comment   Case was discussed with internal medicine and neurology and the patient's admission status was agreed to be Admission Status: inpatient status to the service of Dr. Robin .               Follow-up Information    None         Patient's Medications    No medications on file       No discharge procedures on file.    PDMP Review       None             ED Provider  Attending physically available and evaluated Chris Novak. I managed the patient along with the ED Attending.    Electronically Signed by    Portions of the record may have been created with voice recognition software.  Occasional wrong word or \"sound a like\" substitutions may have occurred due to the inherent limitations of voice recognition software.  Read the chart " carefully and recognize, using context, where substitutions have occurred       Da Ward MD  01/09/24 1097

## 2024-01-09 NOTE — QUICK NOTE
Notified by nursing that patient left AMA; Keppra 500 mg sent to his pharmacy as recommended by neurology.  Patient also given ambulatory referral to neurology for follow-up.

## 2024-01-09 NOTE — ED NOTES
RN attempted to put IV in, blood work was successful but Pt insisted on no IV and refused to have one. Provider made aware.      Bhavna Noonan RN  01/09/24 8950

## 2024-01-09 NOTE — H&P
[facility]  H&P  Name: Chris Novak 23 y.o. male I MRN: [id number]  Unit/Bed#: ED-12 I Date of Admission: 1/9/2024   Date of Service: 1/9/2024 I Hospital Day: 0      Assessment/Plan   * Witnessed seizure-like activity (HCC)  Assessment & Plan  Patient presents with new onset seizures witnessed by girlfriend and while in the ED  Initially declined evaluation and treatment; brought back to ED after experiencing another seizure  Neurology consulted, recommended admission for workup and treatment  As per ED staff, neurology recommended admission to [place] versus transfer to [place] for EMU  Continue [facility]  Consult to neurology  Seizure precautions  Patient with leukocytosis likely secondary to seizure activity (no other SIRS criteria); low suspicion for active infection  Urine drug screen pending; however acetaminophen, aspirin, and alcohol negative; patient reports illicit substance usage  EKG within normal limits; CT head negative    High anion gap metabolic acidosis  Assessment & Plan  Presenting labs; CO2 16; anion gap 18  Will obtain lactic acid and CK  Gentle IV fluids; likely secondary to seizure activity  Monitor           VTE Pharmacologic Prophylaxis:   Low Risk (Score 0-2) - Encourage Ambulation.  Code Status: Level 1 - Full Code   Discussion with family: Updated  (significant other) via phone.    Anticipated Length of Stay: Patient will be admitted on an inpatient basis with an anticipated length of stay of greater than 2 midnights secondary to seizure disorder.    Total Time Spent on Date of Encounter in care of patient: 45 mins. This time was spent on one or more of the following: performing physical exam; counseling and coordination of care; obtaining or reviewing history; documenting in the medical record; reviewing/ordering tests, medications or procedures; communicating with other healthcare professionals and discussing with patient's  family/caregivers.    Chief Complaint: Witnessed seizures    History of Present Illness:  Chris Novak is a 23 y.o. male no significant past medical history who presents with multiple seizures witnessed by significant other and while in the ED.  History obtained from ED provider and ED documentation as patient lethargic on exam.  As per ED provider, neurology recommended admission at [facility] and was started on IV Keppra; CT of the head negative patient denies drug and alcohol usage.    Review of Systems:  Review of Systems   Unable to perform ROS: Mental status change   Constitutional:  Negative for chills and fever.   HENT:  Negative for ear pain and sore throat.    Eyes:  Negative for pain and visual disturbance.   Respiratory:  Negative for cough and shortness of breath.    Cardiovascular:  Negative for chest pain and palpitations.   Gastrointestinal:  Negative for abdominal pain and vomiting.   Genitourinary:  Negative for dysuria and hematuria.   Musculoskeletal:  Negative for arthralgias and back pain.   Skin:  Negative for color change and rash.   Neurological:  Positive for seizures. Negative for syncope.   Psychiatric/Behavioral:  Positive for confusion and dysphoric mood.    All other systems reviewed and are negative.      Past Medical and Surgical History:   Past Medical History:   Diagnosis Date    Fracture of right wrist        No past surgical history on file.    Meds/Allergies:  Prior to Admission medications    Not on File     I have reviewed home medications using recent Epic encounter.    Allergies:   Allergies   Allergen Reactions    Penicillins        Social History:  Marital Status: Single   Occupation:   Patient Pre-hospital Living Situation: Home  Patient Pre-hospital Level of Mobility: walks  Patient Pre-hospital Diet Restrictions: None  Substance Use History:   Social History     Substance and Sexual Activity   Alcohol Use No     Social History     Tobacco Use   Smoking  Status Never   Smokeless Tobacco Never     Social History     Substance and Sexual Activity   Drug Use Yes    Types: Marijuana       Family History:  Family History   Problem Relation Age of Onset    Alcohol abuse Mother     Diabetes Maternal Grandfather        Physical Exam:     Vitals:   Blood Pressure: 135/72 (01/09/24 1300)  Pulse: 64 (01/09/24 1300)  Temperature: (!) 101.7 °F (38.7 °C) (01/09/24 1229)  Temp Source: Axillary (01/09/24 1229)  Respirations: 20 (01/09/24 1300)  SpO2: 95 % (01/09/24 1300)    Physical Exam     Additional Data:     Lab Results:  Results from last 7 days   Lab Units 01/09/24  0741   WBC Thousand/uL 13.14*   HEMOGLOBIN g/dL 15.4   HEMATOCRIT % 46.0   PLATELETS Thousands/uL 337   NEUTROS PCT % 81*   LYMPHS PCT % 14   MONOS PCT % 5   EOS PCT % 0     Results from last 7 days   Lab Units 01/09/24  0741   SODIUM mmol/L 130*   POTASSIUM mmol/L 4.8   CHLORIDE mmol/L 96   CO2 mmol/L 16*   BUN mg/dL 14   CREATININE mg/dL 0.97   ANION GAP mmol/L 18   CALCIUM mg/dL 9.4   ALBUMIN g/dL 4.7   TOTAL BILIRUBIN mg/dL 0.80   ALK PHOS U/L 113*   ALT U/L 13   AST U/L 27   GLUCOSE RANDOM mg/dL 114                       Lines/Drains:  Invasive Devices       Peripheral Intravenous Line  Duration             Peripheral IV 01/09/24 Dorsal (posterior);Right Hand <1 day                        Imaging: Personally reviewed the following imaging: CT head  CT head without contrast   ED Interpretation by Kelsi Lind DO (01/09 0842)   Interpreted by me as no obvious bleed      Final Result by Chris Vernon MD (01/09 0836)      No evidence of acute intracranial process or significant interval change.                  Workstation performed: [id number]         MRI inpatient order    (Results Pending)       EKG and Other Studies Reviewed on Admission:   EKG: NSR. HR  .    ** Please Note: This note has been constructed using a voice recognition system. **

## 2024-01-09 NOTE — ED NOTES
Patient woken up by RN and re-oriented, patient continued to fall back asleep unless repeatedly stimulated. RN informed patient on the need for a urine sample. Patient rolled over and faced away from RN.      Sancho Llanos RN  01/09/24 2179

## 2024-01-09 NOTE — PROGRESS NOTES
Patient refused medical advice and treatment. Patient signed AMA form and provider was notified. IV was removed, belongings were sent, and AVS was given.

## 2024-01-09 NOTE — ED PROVIDER NOTES
History  Chief Complaint   Patient presents with    Seizure - Prior Hx Of     Pt arrives via EMS from home. Pt girlfriend called EMS because pt was seizing. Pt states he wishes to leave and does not want to be here.     23-year-old male who presents with seizure-like activity.  Per EMS, patient was asleep and girlfriend witnessed seizure-like activity lasting approximately 2 minutes.  When EMS arrived, he appeared postictal and slightly confused.  On arrival, patient is requesting to leave because he has work in the morning.  States that he has experienced seizures in the past.  Denies any alcohol or drug use this evening.        None       Past Medical History:   Diagnosis Date    Fracture of right wrist        No past surgical history on file.    Family History   Problem Relation Age of Onset    Alcohol abuse Mother     Diabetes Maternal Grandfather      I have reviewed and agree with the history as documented.    E-Cigarette/Vaping     E-Cigarette/Vaping Substances     Social History     Tobacco Use    Smoking status: Never    Smokeless tobacco: Never   Substance Use Topics    Alcohol use: No    Drug use: Yes     Types: Marijuana       Review of Systems   Constitutional:  Negative for chills, fatigue and fever.   HENT:  Negative for rhinorrhea, sore throat and trouble swallowing.    Eyes:  Negative for photophobia and visual disturbance.   Respiratory:  Negative for cough, chest tightness and shortness of breath.    Cardiovascular:  Negative for chest pain, palpitations and leg swelling.   Gastrointestinal:  Negative for abdominal pain, blood in stool, diarrhea, nausea and vomiting.   Endocrine: Negative for polyuria.   Genitourinary:  Negative for dysuria, flank pain and hematuria.   Musculoskeletal:  Negative for back pain and neck pain.   Skin:  Negative for color change and rash.   Allergic/Immunologic: Negative for immunocompromised state.   Neurological:  Positive for seizures. Negative for dizziness,  weakness, light-headedness, numbness and headaches.   All other systems reviewed and are negative.      Physical Exam  Physical Exam  Vitals and nursing note reviewed.   Constitutional:       General: He is not in acute distress.     Appearance: He is well-developed.   HENT:      Head: Normocephalic and atraumatic.      Mouth/Throat:      Lips: Pink.      Mouth: Mucous membranes are moist.   Eyes:      General: Lids are normal.      Extraocular Movements: Extraocular movements intact.      Conjunctiva/sclera: Conjunctivae normal.      Pupils: Pupils are equal, round, and reactive to light.   Cardiovascular:      Rate and Rhythm: Normal rate and regular rhythm.      Heart sounds: Normal heart sounds. No murmur heard.  Pulmonary:      Effort: Pulmonary effort is normal.      Breath sounds: Normal breath sounds.   Abdominal:      General: There is no distension.      Palpations: Abdomen is soft.      Tenderness: There is no abdominal tenderness. There is no guarding or rebound.   Musculoskeletal:         General: No swelling.      Cervical back: Full passive range of motion without pain, normal range of motion and neck supple.   Skin:     General: Skin is warm.      Capillary Refill: Capillary refill takes less than 2 seconds.      Findings: No rash.   Neurological:      General: No focal deficit present.      Mental Status: He is alert and oriented to person, place, and time.      GCS: GCS eye subscore is 4. GCS verbal subscore is 5. GCS motor subscore is 6.      Cranial Nerves: Cranial nerves 2-12 are intact.      Sensory: Sensation is intact.      Motor: Motor function is intact.      Gait: Gait is intact.   Psychiatric:         Mood and Affect: Mood normal.         Speech: Speech normal.         Behavior: Behavior normal.         Vital Signs  ED Triage Vitals [01/09/24 0200]   Temp Pulse Respirations Blood Pressure SpO2   -- 98 18 126/78 97 %      Temp src Heart Rate Source Patient Position - Orthostatic VS BP  Location FiO2 (%)   -- Monitor Lying Right arm --      Pain Score       No Pain           Vitals:    01/09/24 0200   BP: 126/78   Pulse: 98   Patient Position - Orthostatic VS: Lying         Visual Acuity      ED Medications  Medications - No data to display    Diagnostic Studies  Results Reviewed       None                   No orders to display              Procedures  Procedures         ED Course                               SBIRT 20yo+      Flowsheet Row Most Recent Value   Initial Alcohol Screen: US AUDIT-C     1. How often do you have a drink containing alcohol? 4 Filed at: 01/09/2024 0202   2. How many drinks containing alcohol do you have on a typical day you are drinking?  1 Filed at: 01/09/2024 0202   3a. Male UNDER 65: How often do you have five or more drinks on one occasion? 0 Filed at: 01/09/2024 0202   Audit-C Score 5 Filed at: 01/09/2024 0202   TERE: How many times in the past year have you...    Used an illegal drug or used a prescription medication for non-medical reasons? Never Filed at: 01/09/2024 0202                      Medical Decision Making  Patient presents with breakthrough seizure.  Has never been evaluated for seizures by family doctor or neurology.  I did recommend follow-up with his family doctor.  He is alert and oriented x 3 with a GCS 15.  No evidence of intoxication.  He is requesting to leave.  Plan to discharge the patient.  Patient aware of risks of leaving without workup.    Problems Addressed:  Seizure (HCC): complicated acute illness or injury that poses a threat to life or bodily functions             Disposition  Final diagnoses:   Seizure (HCC)     Time reflects when diagnosis was documented in both MDM as applicable and the Disposition within this note       Time User Action Codes Description Comment    1/9/2024  2:08 AM Diogo Nguyễn Add [R56.9] Seizure (HCC)           ED Disposition       ED Disposition   Discharge    Condition   Stable    Date/Time   Tue Jan 9,  2024 0208    Comment   Chris Novak discharge to home/self care.                   Follow-up Information       Follow up With Specialties Details Why Contact Info Additional Information    [facility] Emergency Department Emergency Medicine Go to  If symptoms worsen [address] [facility] [address]  [phone] [facility] Emergency Department, [address] [place], [place], [address]            There are no discharge medications for this patient.      No discharge procedures on file.    PDMP Review       None            ED Provider  Electronically Signed by             Diogo Nguyễn MD  01/09/24 0211

## 2024-01-09 NOTE — CONSULTS
"Consultation - Neurology   Chris Novak 23 y.o. male MRN: [id number]  Unit/Bed#: ED-12 Encounter: [id number]      Assessment/Plan     * Witnessed seizure-like activity (HCC)  Assessment & Plan  23-year-old male with reported prior episodes of seizure-like activity, drug use, and alcohol use, presented to the ED earlier this morning after girlfriend witnessed generalized tonic-clonic seizure activity while the patient was sleeping, lasting for approximately 2 minutes.  In the ED, he refused workup and was discharged home.  He returned after a second episode occurred at home.  CT head was negative for acute pathology.  Lab work noted mild leukocytosis and hyponatremia (130).    Patient is uncooperative with history and exam, therefore unable to obtain additional information.  He does state that his seizures present as \"shaking\" and is \"sometimes aware of them.\"  He does not answer any questions regarding auras, tongue bite, incontinence, family history, prior workup, or seizure risk factors.     Plan:  - Ideally would obtain routine EEG and MRI brain w and wo contrast   - Current AED regimen:   S/p Keppra 1g   Continue on Keppra 500 mg BID   - Lactic acid, CK, and UDS pending   - Discussed driving precautions, though unclear if patient drives as he did not answer any questions. Will submit [employer] form and try to rediscuss at another time.   - Medical management and supportive care per primary team. Correction of any metabolic or infectious disturbances, including management of hyponatremia          Chris Novak will need follow up in in 6 weeks with epilepsy attending or advance practitioner.    History of Present Illness     Reason for Consult / Principal Problem: Seizure  Hx and PE limited by: Patient refusing to cooperate with examiner.     HPI: Chris Novak is a 23 y.o. male with prior episodes of seizure activity, drug use, and alcohol use who originally presented to the ED early this morning due " "to seizure-like activity.  Patient was reportedly asleep, when his girlfriend witnessed approximately 2 minutes of generalized tonic-clonic seizure activity.  EMS was called, and upon their arrival, patient was noted to be postictal/confused.  On arrival to the ED, all vital signs were stable.  He was fully awake and alert.  Reportedly he was back to baseline, so he stated that he wanted to leave the hospital and was ultimately discharged.  He refused all workup at that time.  He presented again later in the morning after another seizure was witnessed by his girlfriend.  He was confused upon arrival to the ED this time.  He reported a headache and nausea/vomiting in the ED.  He denied any other focal neurologic symptoms.  He admitted to marijuana use, but denied other drug use.  Of note, the patient was recently in the ED on 12/24 for an opioid overdose.  He does admit to alcohol use of approximately 2 drinks, 3 times a week.  States his last drink was 1 week ago.  He has never been evaluated for seizures in the past, but reportedly has had seizure-like activity in the past.    He received 1 g of Keppra in the ED.  He also received 2 mg of Ativan, Zofran, and fluids.  Required restraints in the ED initially.  Lab work revealed mild hyponatremia (130) and leukocytosis.  CT head was unremarkable.    Patient is refusing to participate in questioning and exam at this time.  When asked about his prior seizures, he only states that he has \"shaking\" and is sometimes aware of them, sometimes not.  He was not evaluated by neurology in the past.  States he did not bite his tongue this time or on prior occasions.  Does not answer regarding bowel or bladder incontinence.  When asked about drug use, he states \"I don't know.\"  When asked about alcohol use, he states \"I don't know.\"  He does not answer the question when asked if he drives.  He does not answer any questions regarding prior head trauma, strokes, CNS infections, or " "developmental issues, which could raise his risk for seizures.  Does not answer when asked if he has a family history of seizures.  When asked what he remembers about this morning, he just states \"I want water.\"    Inpatient consult to Neurology  Consult performed by: Allyssa Castrejon PA-C  Consult ordered by: Kelsi Lind DO          Review of Systems   Reason unable to perform ROS: Refusing to participate in questioning.       Historical Information   Past Medical History:   Diagnosis Date    Fracture of right wrist      No past surgical history on file.  Social History   Social History     Substance and Sexual Activity   Alcohol Use No     Social History     Substance and Sexual Activity   Drug Use Yes    Types: Marijuana     E-Cigarette/Vaping     E-Cigarette/Vaping Substances     Social History     Tobacco Use   Smoking Status Never   Smokeless Tobacco Never     Family History:   Family History   Problem Relation Age of Onset    Alcohol abuse Mother     Diabetes Maternal Grandfather        Review of previous medical records was completed.     Meds/Allergies   all current active meds have been reviewed, current meds:   Current Facility-Administered Medications   Medication Dose Route Frequency    acetaminophen (TYLENOL) rectal suppository 325 mg  325 mg Rectal Q4H PRN    folic acid (FOLVITE) tablet 1 mg  1 mg Oral Daily    levETIRAcetam (KEPPRA) 1,000 mg in sodium chloride 0.9 % 100 mL IVPB  1,000 mg Intravenous Once    LORazepam (ATIVAN) injection 2 mg  2 mg Intravenous Q8H PRN    multivitamin-minerals (CENTRUM) tablet 1 tablet  1 tablet Oral Daily    ondansetron (ZOFRAN) injection 4 mg  4 mg Intravenous Q6H PRN    sodium chloride 0.9 % bolus 1,000 mL  1,000 mL Intravenous Once    thiamine tablet 100 mg  100 mg Oral Daily   , and PTA meds:   None       Allergies   Allergen Reactions    Penicillins        Objective   Vitals:Blood pressure 135/72, pulse 64, temperature (!) 101.7 °F (38.7 °C), temperature " source Axillary, resp. rate 20, SpO2 95%.,There is no height or weight on file to calculate BMI.    Intake/Output Summary (Last 24 hours) at 1/9/2024 1350  Last data filed at 1/9/2024 1100  Gross per 24 hour   Intake 1100 ml   Output --   Net 1100 ml       Invasive Devices:   Invasive Devices       Peripheral Intravenous Line  Duration             Peripheral IV 01/09/24 Dorsal (posterior);Right Hand <1 day                    Physical Exam  Neurologic Exam  Patient refusing physical and neurologic exam at this time. Keeps turning away from examiner in bed, pulling blanket over his head.     Was sleeping on arrival, but mumbles when examiner attempts to wake him. Does not attempt to answer orientation questions- ignores examiner.  Did briefly open his eyes and look at examiner.     Gaze is conjugate and pupils appeared symmetric. Could not assess with light. Patient refusing visual field testing or EOM testing.   No obvious facial asymmetry evident.     Was seen moving both upper extremities symmetrically when pulling blanket over his head or adjusting pillow. Symmetric movement of legs seen when adjusting himself in bed.     No tremors or rhythmic seizure activity noted.       Lab Results: I have personally reviewed pertinent reports.    Imaging Studies: I have personally reviewed pertinent reports.   and I have personally reviewed pertinent films in PACS ([facility])  EKG, Pathology, and Other Studies: I have personally reviewed pertinent reports.    VTE Prophylaxis: None currently - in ED    Code Status: Level 1 - Full Code

## 2024-01-09 NOTE — ED NOTES
Patient uncooperative, unable to follow commands. Soft restraints being applied      Sancho Llanos RN  01/09/24 0904

## 2024-01-09 NOTE — ED NOTES
RN was present in another room when a bystander came into the room yelling for help, this RN ran into patient's room, patient was found to be seizing, staff assist button pressed, non-rebreather oxygen applied due to oxygen saturation dropping to 85%, providers at bedside, IM ativan given. Seizure lasting approx 3 minutes.      Sancho Llanos RN  01/09/24 0900

## 2024-01-09 NOTE — ASSESSMENT & PLAN NOTE
"23-year-old male with reported prior episodes of seizure-like activity, drug use, and alcohol use, presented to the ED earlier this morning after girlfriend witnessed generalized tonic-clonic seizure activity while the patient was sleeping, lasting for approximately 2 minutes.  In the ED, he refused workup and was discharged home.  He returned after a second episode occurred at home, and an additional episode occurred in the ED.  CT head was negative for acute pathology.  Lab work noted mild leukocytosis and hyponatremia (130).    Patient is uncooperative with history and exam, therefore unable to obtain additional information.  He does state that his seizures present as \"shaking\" and is \"sometimes aware of them.\"  He does not answer any questions regarding auras, tongue bite, incontinence, family history, prior workup, or seizure risk factors.     Plan:  - Ideally would obtain routine EEG and MRI brain w and wo contrast   - Current AED regimen:   S/p Keppra 1g   Continue on Keppra 500 mg BID   - Lactic acid, CK, and UDS pending   - Discussed driving precautions, though unclear if patient drives as he did not answer any questions. Will submit [employer] form and try to rediscuss at another time.   - Medical management and supportive care per primary team. Correction of any metabolic or infectious disturbances, including management of hyponatremia      "

## 2024-01-09 NOTE — ASSESSMENT & PLAN NOTE
Presenting labs; CO2 16; anion gap 18  Will obtain lactic acid and CK  Gentle IV fluids; likely secondary to seizure activity  Monitor

## 2024-01-10 NOTE — UTILIZATION REVIEW
Initial Clinical Review    Admission: Date/Time/Statement:   Admission Orders (From admission, onward)       Ordered        01/09/24 1129  INPATIENT ADMISSION  Once                          Orders Placed This Encounter   Procedures    INPATIENT ADMISSION     Standing Status:   Standing     Number of Occurrences:   1     Order Specific Question:   Level of Care     Answer:   Med Surg [16]     Order Specific Question:   Estimated length of stay     Answer:   More than 2 Midnights     Order Specific Question:   Certification     Answer:   I certify that inpatient services are medically necessary for this patient for a duration of greater than two midnights. See H&P and MD Progress Notes for additional information about the patient's course of treatment.     ED Arrival Information       Expected   -    Arrival   1/9/2024 06:29    Acuity   Urgent              Means of arrival   Walk-In    Escorted by   [facility] ([facility])    Service   Hospitalist    Admission type   Emergency              Arrival complaint   seizure             Chief Complaint   Patient presents with    Seizure - Prior Hx Of     Pt arrives via EMS from home after experiencing a seizure. Pt was seen here earlier tonight for another seizure earlier but refused treatment and left. Pt alert and oriented x 4 upon arrival.        Initial Presentation: 23 y.o. male presents to the ED via EMS from home with c/o multiple witnessed seizures, denies drug and alcohol use.  Pt was also seen in the ED earlier with same complaint and signed out AMA.  PMH: none.  In the ED he had another witnessed seizure and developed a fever.  Labs - leukocytosis, elevated alk phos, low , anion gap 18.  .  Imaging - no acute disease. ECG - NSR.  Treated with ODT Zofran, Droperidol, IV fluids, IV Ativan, IV Keppra, R Tylenol.  On exam mental status changed, seizure, confusiono, dysphoric mood.  He is admitted to INPATIENT status with Witnessed seizure activity -  neuro consult.  Keppra, seizure prec, USD pending.  High anion gap metabolic acidosis - check lactic acid, CK, IV fluids.      Pt left the floor AMA.  Provider sent Keppra rx to his pharmacy and ambulatory referral to OP Neuro.     ED Triage Vitals   Temperature Pulse Respirations Blood Pressure SpO2   01/09/24 0645 01/09/24 0632 01/09/24 0632 01/09/24 0632 01/09/24 0632   97.6 °F (36.4 °C) 98 16 109/67 97 %      Temp Source Heart Rate Source Patient Position - Orthostatic VS BP Location FiO2 (%)   01/09/24 0645 01/09/24 0632 01/09/24 0632 01/09/24 0632 --   Axillary Monitor Lying Right arm       Pain Score       01/09/24 0632       6          Wt Readings from Last 1 Encounters:   01/09/24 80 kg (176 lb 5.9 oz)     Additional Vital Signs:   Date/Time Temp Pulse Resp BP MAP (mmHg) SpO2 O2 Device Patient Position - Orthostatic VS   01/09/24 1500 -- -- -- -- -- -- None (Room air) --   01/09/24 1441 98.4 °F (36.9 °C) 58 18 108/54 65 -- -- Lying   01/09/24 1300 -- 64 20 135/72 93 95 % None (Room air) Lying   01/09/24 1229 101.7 °F (38.7 °C) Abnormal  -- -- -- -- -- -- --   01/09/24 1130 -- 75 24 Abnormal  115/56 -- 96 % None (Room air) Lying   01/09/24 0900 -- 78 20 132/68 94 96 % None (Room air) Lying   01/09/24 0645 97.6 °F (36.4 °C) 66 23 Abnormal  116/75 91 100 % None (Room air) Lying       Pertinent Labs/Diagnostic Test Results:     1/9 ECG - Normal sinus rhythm     CT head without contrast   ED Interpretation by Kelsi Lind DO (01/09 0842)   Interpreted by me as no obvious bleed      Final Result by Chris Vernon MD (01/09 0836)      No evidence of acute intracranial process or significant interval change.                  Workstation performed: [id number]               Results from last 7 days   Lab Units 01/09/24  0741   WBC Thousand/uL 13.14*   HEMOGLOBIN g/dL 15.4   HEMATOCRIT % 46.0   PLATELETS Thousands/uL 337   NEUTROS ABS Thousands/µL 10.68*         Results from last 7 days   Lab Units  01/09/24  0741   SODIUM mmol/L 130*   POTASSIUM mmol/L 4.8   CHLORIDE mmol/L 96   CO2 mmol/L 16*   ANION GAP mmol/L 18   BUN mg/dL 14   CREATININE mg/dL 0.97   EGFR ml/min/1.73sq m 109   CALCIUM mg/dL 9.4     Results from last 7 days   Lab Units 01/09/24  0741   AST U/L 27   ALT U/L 13   ALK PHOS U/L 113*   TOTAL PROTEIN g/dL 7.5   ALBUMIN g/dL 4.7   TOTAL BILIRUBIN mg/dL 0.80   AMMONIA umol/L 68         Results from last 7 days   Lab Units 01/09/24  0741   GLUCOSE RANDOM mg/dL 114     Results from last 7 days   Lab Units 01/09/24  0741   CK TOTAL U/L 162     Results from last 7 days   Lab Units 01/09/24  1222   LACTIC ACID mmol/L 0.7     Results from last 7 days   Lab Units 01/09/24  0741   LIPASE u/L 15     Results from last 7 days   Lab Units 01/09/24  0741   ETHANOL LVL mg/dL <10   ACETAMINOPHEN LVL ug/mL <2*   SALICYLATE LVL mg/dL <5     ED Treatment:   Medication Administration from 01/09/2024 0629 to 01/09/2024 1428         Date/Time Order Dose Route Action     01/09/2024 0657 EST ondansetron (ZOFRAN-ODT) dispersible tablet 4 mg 4 mg Oral Given     01/09/2024 0742 EST droperidol (INAPSINE) injection 2.5 mg 2.5 mg Intramuscular Given     01/09/2024 0957 EST sodium chloride 0.9 % bolus 1,000 mL -- Intravenous Restarted     01/09/2024 0859 EST LORazepam (ATIVAN) injection 2 mg 2 mg Intramuscular Given     01/09/2024 0957 EST levETIRAcetam (KEPPRA) 1,000 mg in sodium chloride 0.9 % 100 mL IVPB -- Intravenous Restarted     01/09/2024 1300 EST acetaminophen (TYLENOL) rectal suppository 325 mg 325 mg Rectal Given          Past Medical History:   Diagnosis Date    Fracture of right wrist      Present on Admission:   Witnessed seizure-like activity (HCC)   High anion gap metabolic acidosis      Admitting Diagnosis: Seizure (HCC) [R56.9]  Age/Sex: 23 y.o. male  Admission Orders:  Scheduled Medications:    IV Keppra 1000 mg x 1    Continuous IV Infusions:      PRN Meds:  Tylenol R 325 x 1 1/9    Seizure prec  IP CONSULT  TO NEUROLOGY    Network Utilization Review Department  ATTENTION: Please call with any questions or concerns to [phone] and carefully listen to the prompts so that you are directed to the right person. All voicemails are confidential.   For Discharge needs, contact Care Management DC Support Team at [phone] opt. 2  Send all requests for admission clinical reviews, approved or denied determinations and any other requests to dedicated fax number below belonging to the campus where the patient is receiving treatment. List of dedicated fax numbers for the Facilities:  FACILITY NAME UR FAX NUMBER   ADMISSION DENIALS (Administrative/Medical Necessity) [phone]   DISCHARGE SUPPORT TEAM (NETWORK) [phone]   PARENT CHILD HEALTH (Maternity/NICU/Pediatrics) [phone]   [facility] [phone]   [facility] [phone]   [facility] [phone]   [facility] [phone]   [facility] [phone]   [facility] [phone]   [facility] [phone]   [facility] [phone]   [facility] [phone]   [facility] [phone]   [facility] [phone]

## 2024-02-08 ENCOUNTER — TELEPHONE (OUTPATIENT)
Dept: NEUROLOGY | Facility: CLINIC | Age: 24
End: 2024-02-08

## 2024-02-08 NOTE — TELEPHONE ENCOUNTER
1ST ATTEMPT,     Called pt no answer, LMOM.    Thank you,     Dora CALDERON/ [facility] ALL/ WITNESS SEIZURE ACTIVITY    DC- HOME - 1/9/2024    Chris Novak will need follow up in in 6 weeks with epilepsy attending or advance practitioner.